# Patient Record
Sex: MALE | Race: WHITE | Employment: FULL TIME | URBAN - NONMETROPOLITAN AREA
[De-identification: names, ages, dates, MRNs, and addresses within clinical notes are randomized per-mention and may not be internally consistent; named-entity substitution may affect disease eponyms.]

---

## 2017-03-07 ENCOUNTER — OFFICE VISIT (OUTPATIENT)
Dept: CARDIOLOGY | Age: 47
End: 2017-03-07

## 2017-03-07 VITALS
HEIGHT: 69 IN | WEIGHT: 183.5 LBS | DIASTOLIC BLOOD PRESSURE: 70 MMHG | SYSTOLIC BLOOD PRESSURE: 110 MMHG | BODY MASS INDEX: 27.18 KG/M2 | HEART RATE: 72 BPM

## 2017-03-07 DIAGNOSIS — I49.3 PVC (PREMATURE VENTRICULAR CONTRACTION): ICD-10-CM

## 2017-03-07 DIAGNOSIS — Z00.00 ANNUAL PHYSICAL EXAM: Primary | ICD-10-CM

## 2017-03-07 PROCEDURE — G8419 CALC BMI OUT NRM PARAM NOF/U: HCPCS | Performed by: INTERNAL MEDICINE

## 2017-03-07 PROCEDURE — 93000 ELECTROCARDIOGRAM COMPLETE: CPT | Performed by: INTERNAL MEDICINE

## 2017-03-07 PROCEDURE — G8427 DOCREV CUR MEDS BY ELIG CLIN: HCPCS | Performed by: INTERNAL MEDICINE

## 2017-03-07 PROCEDURE — 1036F TOBACCO NON-USER: CPT | Performed by: INTERNAL MEDICINE

## 2017-03-07 PROCEDURE — 99213 OFFICE O/P EST LOW 20 MIN: CPT | Performed by: INTERNAL MEDICINE

## 2017-03-07 PROCEDURE — G8484 FLU IMMUNIZE NO ADMIN: HCPCS | Performed by: INTERNAL MEDICINE

## 2017-03-07 RX ORDER — MELOXICAM 7.5 MG/1
7.5 TABLET ORAL DAILY
COMMUNITY
End: 2018-08-26

## 2017-09-05 ENCOUNTER — HOSPITAL ENCOUNTER (OUTPATIENT)
Dept: PHYSICAL THERAPY | Age: 47
Setting detail: THERAPIES SERIES
Discharge: HOME | End: 2017-09-05
Payer: COMMERCIAL

## 2017-09-11 ENCOUNTER — HOSPITAL ENCOUNTER (OUTPATIENT)
Dept: PHYSICAL THERAPY | Age: 47
Setting detail: THERAPIES SERIES
Discharge: HOME | End: 2017-09-11
Payer: COMMERCIAL

## 2017-09-11 PROCEDURE — 97750 PHYSICAL PERFORMANCE TEST: CPT

## 2017-09-26 ENCOUNTER — HOSPITAL ENCOUNTER (OUTPATIENT)
Dept: PHYSICAL THERAPY | Age: 47
Setting detail: THERAPIES SERIES
Discharge: HOME | End: 2017-09-26
Payer: COMMERCIAL

## 2017-09-26 PROCEDURE — 97537 COMMUNITY/WORK REINTEGRATION: CPT

## 2017-09-27 ENCOUNTER — HOSPITAL ENCOUNTER (OUTPATIENT)
Dept: PHYSICAL THERAPY | Age: 47
Setting detail: THERAPIES SERIES
Discharge: HOME | End: 2017-09-27
Payer: COMMERCIAL

## 2017-09-27 PROCEDURE — 97537 COMMUNITY/WORK REINTEGRATION: CPT

## 2017-09-29 ENCOUNTER — HOSPITAL ENCOUNTER (OUTPATIENT)
Dept: PHYSICAL THERAPY | Age: 47
Setting detail: THERAPIES SERIES
End: 2017-09-29
Payer: COMMERCIAL

## 2017-09-29 ENCOUNTER — APPOINTMENT (OUTPATIENT)
Dept: PHYSICAL THERAPY | Age: 47
End: 2017-09-29
Payer: COMMERCIAL

## 2017-10-02 ENCOUNTER — HOSPITAL ENCOUNTER (OUTPATIENT)
Dept: PHYSICAL THERAPY | Age: 47
Setting detail: THERAPIES SERIES
Discharge: HOME | End: 2017-10-02
Payer: COMMERCIAL

## 2017-10-02 PROCEDURE — 97537 COMMUNITY/WORK REINTEGRATION: CPT

## 2017-10-03 ENCOUNTER — HOSPITAL ENCOUNTER (OUTPATIENT)
Dept: PHYSICAL THERAPY | Age: 47
Setting detail: THERAPIES SERIES
End: 2017-10-03
Payer: COMMERCIAL

## 2017-10-04 ENCOUNTER — HOSPITAL ENCOUNTER (OUTPATIENT)
Dept: PHYSICAL THERAPY | Age: 47
Setting detail: THERAPIES SERIES
Discharge: HOME | End: 2017-10-04
Payer: COMMERCIAL

## 2017-10-05 ENCOUNTER — HOSPITAL ENCOUNTER (OUTPATIENT)
Dept: PHYSICAL THERAPY | Age: 47
Setting detail: THERAPIES SERIES
Discharge: HOME | End: 2017-10-05
Payer: COMMERCIAL

## 2017-10-05 PROCEDURE — 97537 COMMUNITY/WORK REINTEGRATION: CPT

## 2017-10-05 NOTE — PROGRESS NOTES
[address] [address]   OUTPATIENT PHYSICAL THERAPY  [facility]ATION  DAILY NOTE    YOB: 1970  REFERRING PHYSICIAN:  Dr. Renetta Andrea  DIAGNOSIS:  C6-7 vertebral fracture, right shoulder sprain  CLAIM #:  [id number]   DATE OF ONSET:  2016    Visit 5     REVIEWED:    []  Program guidelines. []  Attendance agreement.    [x]  Perceived Intensity of Pain - 10 point scale       STRETCHES:     [x] CERVICAL: 5 repetitions x 1 set for 10 seconds     [x] RIGHT SHOULDER:  5 repetitions x 1 set for 10 seconds      [x]LEFT SHOULDER: 5 repetitions x 1 set for 10 seconds     [x] LOW BACK: 5 repetitions x 1 set for 10 seconds     [x] RIGHT HAMSTRIN repetitions x 1 set for 10 seconds     [x] LEFT HAMSTRIN repetitions x 1 set for 10 seconds     [x] RIGHT WRIST: 5 repetitions x 1 set for 10 seconds     [x] LEFT WRIST: 5 repetitions x 1 set for 10 seconds     [x]LEFT QUAD: 5 repetitions x 1 set for 10 seconds     [x]RIGHT QUAD: 5 repetitions x 1 set for 10 seconds     []LEFT CALF: 5 repetitions x 1 set for 10 seconds     []RIGHT CALF: 5 repetitions x 1 set for 10 seconds         GYM TASKS:     [x] BICEPS: 15 pounds x 10 repetitions x 1 sets     [x] TRICEPS: 30 pounds x 10 repetitions x 1 sets     [x] LOW ROWS: 40 pounds x 10 repetitions x 2 sets     [x] LATERAL PULL DOWNS: 40 pounds x 10 repetitions x 2 sets     [x] CHEST PRESS: 40 pounds x 10 repetitions x 2 sets     [x] LEG EXTENSION: 25 pounds x 10 repetitions x 2 sets     [x] LEG CURL: 20 pounds x 10 repetitions x 2 sets     [] LEG PRESS:  pounds x  repetitions x  sets     [] SCAPULAR EXERCISE:  pounds x  repetitions x  sets     [x] BACK EXTENSION: 40 pounds x 10 repetitions x 2 sets     [x] ROTARY TORSO: 25 pounds x 10 repetitions x 1 sets (each way)    [] ABDOMINAL CURLS:  pounds x  repetitions x  sets     [] CALF RAISES:  pounds x  repetitions x  sets     [] OVERHEAD PRESS:  pounds x  repetitions x  sets     [x] PECTORAL DECK:

## 2017-10-06 ENCOUNTER — HOSPITAL ENCOUNTER (OUTPATIENT)
Dept: PHYSICAL THERAPY | Age: 47
Setting detail: THERAPIES SERIES
Discharge: HOME | End: 2017-10-06
Payer: COMMERCIAL

## 2017-10-06 PROCEDURE — 97537 COMMUNITY/WORK REINTEGRATION: CPT

## 2017-10-09 ENCOUNTER — APPOINTMENT (OUTPATIENT)
Dept: PHYSICAL THERAPY | Age: 47
End: 2017-10-09
Payer: COMMERCIAL

## 2017-10-10 ENCOUNTER — APPOINTMENT (OUTPATIENT)
Dept: PHYSICAL THERAPY | Age: 47
End: 2017-10-10
Payer: COMMERCIAL

## 2017-10-11 ENCOUNTER — HOSPITAL ENCOUNTER (OUTPATIENT)
Dept: PHYSICAL THERAPY | Age: 47
Setting detail: THERAPIES SERIES
Discharge: HOME | End: 2017-10-11
Payer: COMMERCIAL

## 2017-10-11 NOTE — PROGRESS NOTES
[address]  PHYSICAL THERAPY MISSED TREATMENT NOTE  OUTPATIENT REHABILITATION    Date: 10/11/2017  Patient Name: iMsha Cardenas        MRN: [id number]   : 1970  (56 y.o.)  Gender: male     REASON FOR MISSED TREATMENT:  Patient did not show for treatment today.    [facility] VS#[id number] 10/11/2017 1:20 PM

## 2017-10-12 ENCOUNTER — HOSPITAL ENCOUNTER (OUTPATIENT)
Dept: PHYSICAL THERAPY | Age: 47
Setting detail: THERAPIES SERIES
Discharge: HOME | End: 2017-10-12
Payer: COMMERCIAL

## 2017-10-12 NOTE — PROGRESS NOTES
[address] [address]  [facility]  PROGRESS NOTE  PHYSICAL THERAPY      YOB: 1970  REFERRING PHYSICIAN:  Dr. Martinez Necessary  DIAGNOSIS:  C6-7 vertebral fracture, right shoulder sprain  CLAIM #:  [id number]   DATE OF ONSET:  2016  MEDICATIONS: The following medication changes have been reported:  None per patient  ALLERGIES:  Patient reports the following changes in allergies since last note:  none  WEEK: Client has been seen for 6 visits of 26 approved. Client in week 3  ATTENDANCE:  Client has been present for the following sessions since evaluation:     Client has had 2 absences. Client has demonstrated [x] good [] poor  timeliness. PAIN:      LOCATION:  Right shoulder/cervical region    PAIN AT REST:   2-3/10. PAIN WITH ACTIVITY:   3-4/10. HIGHEST PAIN RATIN /10. PAIN AGGRAVATION:    []Bending. [x]Lifting. []Twisting. []Sitting. []Lying down. []Standing. []Walking. [] Other:     PAIN BEHAVIORS OBSERVED:   [] None  [x] Minimal  [] Moderate  [] Maximal and include:   [] Tearful.   [] Verbal reports. [] Antalgic gait. [] Grimacing.   [] Guarding. [] Holding. [] Shaking. [] Other:         SUBJECTIVE:    [] Patient reports increased pain with program  [x] Patient reports tolerating program well  [] Other:    OBJECTIVE:     FLOOR TO WAIST: Evaluation = 10 lbs. Current= 15 lbs. Workload Level:    [] Below Sedentary  [] Sedentary   [x]Sedentary Light   [] Light  [] Light Medium  [] Medium  [] Medium Heavy  [] Heavy  [] Very Heavy    Frequency:   [] Low Occasional  [x] Occasional   [] High Occasional  [] Low Frequent  [] Frequent  [] High Frequent  [] Low Constant  [] Constant     WAIST TO WAIST: Evaluation = 10 lbs. Current=20 lbs.           Workload Level:    [] Below Sedentary  [] Sedentary   []Sedentary Light   [x] Light  [] Light Medium  [] Medium  [] Medium Heavy  [] Heavy  [] Very Heavy      Frequency: [] Low Occasional  [x] Occasional   [] High Occasional  [] Low Frequent  [] Frequent  [] High Frequent  [] Low Constant  [] Constant     WAIST TO SHOULDER: Evaluation = 5 lbs. Current = 10 lbs. Workload Level:    [] Below Sedentary  [x] Sedentary   []Sedentary Light   [] Light  [] Light Medium  [] Medium  [] Medium Heavy  [] Heavy  [] Very Heavy      Frequency:   [x] Low Occasional  [] Occasional   [] High Occasional  [] Low Frequent  [] Frequent  [] High Frequent  [] Low Constant  [] Constant             WORK ACTIVITIES/POSTURES:       SQUAT:  []  None  [x]  Occasional [] Frequent [] Constant     REACH OUT (Shoulder level):    []  None  [x]  Occasional [] Frequent [] Constant       REACH UP (Overhead):  [x]  Rare  []  Occasional [] Frequent [] Constant     BEND:    []  None  [x]  Occasional [] Frequent [] Constant     KNEEL:    [x]  Rare  []  Occasional [] Frequent [] Constant     SIT:   []  None  [x]  Occasional [] Frequent [] Constant     STAND:   []  None  [x]  Occasional [] Frequent [] Constant     WALK:   []  None  [x]  Occasional [] Frequent [] Constant     CRAWL:   [x]  Rare  []  Occasional [] Frequent [] Constant     STAIR CLIMBING:   [x]  Rare  []  Occasional [] Frequent [] Constant     LADDER CLIMBING:   [x]  None  []  Occasional [] Frequent [] Constant      GOALS:  SHORT-TERM GOALS:   1. Client will tolerate program 3-5X/week x 2-4 hrs/session with no pain increase > 2 points to demonstrate work tolerance for return to work. NOT MET     2. Client will perform home exercise program and use proper lifting techniques/body mechanics with moderate verbal cues to perform job tasks. MET     3. Client will demonstrate lifting floor to waist 15 pounds occasionally to tolerate job lifting tasks. MET     4. Client will demonstrate lifting waist to overhead 10 pounds occasionally to tolerate job lifting tasks.  NOT MET     5.  Client will tolerate standing, walking, reaching out and reaching up occasionally to tolerate job tasks. MET        LONG-TERM GOALS:     1. Client will tolerate program 4-5 days/week at 4-6 hrs/session with no pain increase to demonstrate work tolerance for return to work recommendations. NOT MET     2. Client will perform home exercise program and use proper lifting techniques/body mechanics independently to perform job tasks. NOT MET     3. Client will demonstrate lifting floor to waist 60 pounds occasionally to tolerate job lifting tasks. NOT MET     4. Client will tolerate lifting waist to overhead 25 pounds occasionally to tolerate job lifting tasks. NOT MET     5. Client will tolerate standing, reaching out and reaching up frequently to tolerate work tasks. NOT MET         TIME FRAME FOR Long Term GOAL ACHIEVEMENT:      ASSESSMENT:      Client has met  3 out of 5 short term goals. Client has not met 5 out of 5 long term goals. Client's overall strength for tasks has [x] improved [] not changed  [] regressed    Client's tolerance/endurance for tasks has [x] improved [] not changed [] regressed    RECOMMENDED TREATMENT:   [x] Continue  [] Discontinue  [] Other:     PLAN/RECOMMENDATIONS:    [x] Continue with present plan of care. [] Continue treatment with revised plan of care as indicated on next daily note. [x] Continue with Home Exercise Program, symptom management and stretches. [] Discharge from program.   [] Recommend community counseling. [] Recommend exercise in the community. [x] Other:   Client has missed a couple of sessions due to illness and car not starting. Client has made slow but steady progress over the first 2 weeks but has recently had some missed appointments. Plan to progress patient and to have patient's attendance improve.    Crissy CHRISTINA#[id number] 10/12/2017 8:11 AM

## 2017-10-13 ENCOUNTER — APPOINTMENT (OUTPATIENT)
Dept: PHYSICAL THERAPY | Age: 47
End: 2017-10-13
Payer: COMMERCIAL

## 2017-10-16 ENCOUNTER — HOSPITAL ENCOUNTER (OUTPATIENT)
Dept: PHYSICAL THERAPY | Age: 47
Setting detail: THERAPIES SERIES
Discharge: HOME | End: 2017-10-16
Payer: COMMERCIAL

## 2017-10-16 PROCEDURE — 97537 COMMUNITY/WORK REINTEGRATION: CPT

## 2017-10-16 NOTE — PROGRESS NOTES
beginning of session    [x] TREADMILL:  15  minutes at end of session    [] UPPER BODY EXERCISER: minutes     [] ELLIPTICAL: minutes     [] NU-STEP  minutes     [x] Other: Lateral raise 30# 10 reps x 2 set  Abdominal crunches: 20 reps x 2 set      EDUCATION/COPING GROUP:     []  Corrected posture on exercise. [x]   Instructed in lifting techniques. [x]   Instructed in pacing skills. []   Instructed on pain management techniques. [x]   Progress/Educated patient in strength exercises. Client instructed to pace by resting a couple of minutes in between exercises. PAIN BEHAVIORS:   []  Tearful. [x]  Verbal reports. []  Antalgic gait. []  Grimacing. []  Holding.   []  Guarding. []  Jerky movements. []  Shaking. []  Limping. []  Breath holding. []  No Pain Behaviors Observed. JOB SIMULATION:     [x]   Progressed/Educated patient in job simulation tasks. Initiated 10 reps box lifting floor to waist 10 pounds. Performing 1 set of 10 in between strength exercises   [x]   Discussed job simulation goals. Client reports working 4 - 5 hours at a lighter duty ([employer]) work. Reports he still would like to work towards returning to his same position. CASE MANAGEMENT:   [x]  No changes with Plan of Care.  []   Authorization details. []   in clinic to meet with client. []   called clinic to talk with client. []  Script. PAIN BEFOR SESSION: 2/10    PAIN AFTER SESSION: 3/10    BLOOD PRESSURE BEFORE SESSION: 121/74  BLOOD PRESSURE AFTER SESSION: Not recorded  DAILY HOURS: 1.5  ADDITIONAL COMMENTS:      [x]  Tolerated program well. []   Modified program to improve tolerance of program.     [x]  Continue with established plan of care working toward long-term goals. Client missed last week. Most of program levels kept the same due to missing a week.      SESSION START TIME:  0730    SESSION STOP TIME:   0900    TIMED CODE TREATMENT MINUTES:  90    UNTIMED CODE TREATMENT MINUTES:  0    TOTAL TREATMENT TIME:  80  Crissy VIDALES#[id number] 9:09 AM 10/16/2017

## 2017-10-17 ENCOUNTER — APPOINTMENT (OUTPATIENT)
Dept: PHYSICAL THERAPY | Age: 47
End: 2017-10-17
Payer: COMMERCIAL

## 2017-10-19 ENCOUNTER — APPOINTMENT (OUTPATIENT)
Dept: PHYSICAL THERAPY | Age: 47
End: 2017-10-19
Payer: COMMERCIAL

## 2017-10-23 ENCOUNTER — HOSPITAL ENCOUNTER (OUTPATIENT)
Dept: PHYSICAL THERAPY | Age: 47
Setting detail: THERAPIES SERIES
Discharge: HOME | End: 2017-10-23
Payer: COMMERCIAL

## 2017-10-23 PROCEDURE — 97537 COMMUNITY/WORK REINTEGRATION: CPT

## 2017-10-24 ENCOUNTER — HOSPITAL ENCOUNTER (OUTPATIENT)
Dept: PHYSICAL THERAPY | Age: 47
Setting detail: THERAPIES SERIES
End: 2017-10-24
Payer: COMMERCIAL

## 2017-10-25 ENCOUNTER — HOSPITAL ENCOUNTER (OUTPATIENT)
Dept: PHYSICAL THERAPY | Age: 47
Setting detail: THERAPIES SERIES
End: 2017-10-25
Payer: COMMERCIAL

## 2017-10-26 ENCOUNTER — APPOINTMENT (OUTPATIENT)
Dept: PHYSICAL THERAPY | Age: 47
End: 2017-10-26
Payer: COMMERCIAL

## 2017-10-27 ENCOUNTER — HOSPITAL ENCOUNTER (OUTPATIENT)
Dept: PHYSICAL THERAPY | Age: 47
Setting detail: THERAPIES SERIES
Discharge: HOME | End: 2017-10-27
Payer: COMMERCIAL

## 2017-10-27 PROCEDURE — 97537 COMMUNITY/WORK REINTEGRATION: CPT

## 2017-10-27 NOTE — PROGRESS NOTES
[facility]  WORK ORIENTED [facility]  PROGRESS NOTE  PHYSICAL THERAPY      YOB: 1970  REFERRING PHYSICIAN:  Dr. Leno De Santiago  DIAGNOSIS:  C6-7 vertebral fracture, right shoulder sprain  CLAIM #:  [id number]   DATE OF ONSET:  2016  MEDICATIONS: The following medication changes have been reported:  None per patient  ALLERGIES:  Patient reports the following changes in allergies since last note:  none  WEEK: Client has been seen for 10 visits of 26 approved. Client in week 5  ATTENDANCE:  Client has been present for the following sessions since evaluation: ; , 10/2, , , , , , ,     Client has had 5 absences. He was in 2 therapy sessions this week (scheduled for 3). Client has demonstrated [x] good [] poor  timeliness. Timeliness is good; attendance not good. PAIN:      LOCATION:  Right shoulder/cervical region    PAIN AT REST:   2-3/10. PAIN WITH ACTIVITY:   3-4/10. HIGHEST PAIN RATIN /10. PAIN AGGRAVATION:    []Bending. [x]Lifting. []Twisting. []Sitting. []Lying down. []Standing. []Walking. [] Other:     PAIN BEHAVIORS OBSERVED:   [] None  [] Minimal  [x] Moderate  [] Maximal and include:   [] Tearful.   [] Verbal reports. [] Antalgic gait. [] Grimacing.   [] Guarding. [] Holding. [] Shaking. [] Other: Throughout the program pain behaviors have been minimal until this morning (10/27) when patient reports 6/10 and significant increase in pain from increase in work hours (now at 6 - 7). Client reports he was tolerating the work hours up to 5 pretty well. SUBJECTIVE:    [] Patient reports increased pain with program  [] Patient reports tolerating program well  [x] Other:     OBJECTIVE:     FLOOR TO WAIST: Evaluation = 10 lbs. Current= 15 lbs.         Workload Level:    [] Below Sedentary  [] Sedentary   [x]Sedentary Light   [] Light  [] Light Medium  [] Medium  [] Medium Heavy  [] proper lifting techniques/body mechanics with moderate verbal cues to perform job tasks. MET     3. Client will demonstrate lifting floor to waist 15 pounds occasionally to tolerate job lifting tasks. MET     4. Client will demonstrate lifting waist to overhead 10 pounds occasionally to tolerate job lifting tasks. NOT MET     5.  Client will tolerate standing, walking, reaching out and reaching up occasionally to tolerate job tasks. MET        LONG-TERM GOALS:     1. Client will tolerate program 4-5 days/week at 4-6 hrs/session with no pain increase to demonstrate work tolerance for return to work recommendations. NOT MET     2. Client will perform home exercise program and use proper lifting techniques/body mechanics independently to perform job tasks. NOT MET     3. Client will demonstrate lifting floor to waist 60 pounds occasionally to tolerate job lifting tasks. NOT MET     4. Client will tolerate lifting waist to overhead 25 pounds occasionally to tolerate job lifting tasks. NOT MET     5. Client will tolerate standing, reaching out and reaching up frequently to tolerate work tasks. NOT MET         TIME FRAME FOR Long Term GOAL ACHIEVEMENT:      ASSESSMENT:      Client has met  3 out of 5 short term goals. Client has not met 5 out of 5 long term goals. Client's overall strength for tasks has [x] improved [] not changed  [] regressed    Client's tolerance/endurance for tasks has [x] improved [] not changed [] regressed    RECOMMENDED TREATMENT:   [x] Continue  [] Discontinue  [] Other:     PLAN/RECOMMENDATIONS:    [x] Continue with present plan of care. [] Continue treatment with revised plan of care as indicated on next daily note. [x] Continue with Home Exercise Program, symptom management and stretches. [] Discharge from program.   [] Recommend community counseling. [] Recommend exercise in the community.    [x] Other:   Client report increased pain on this date for the first time in the program. Plan will need modified preferably decrease number of work hours to allow patient to tolerate increases in strengthening and conditioning. If employer is open, it may be beneficial to have patient participate in work conditioning program only and progress him from 3 days per week to 5 days per week and from 1 - 2 hours per day to 4 - 6 hours per day to better transition from his current workload levels to full return to work task tolerance.      Yuniel Brumfield AO#[id number] 10/27/2017 10:27 AM

## 2017-10-27 NOTE — PROGRESS NOTES
his pain has increased significantly to 6/10. Will discuss with  options in modifying plan of care at this time. Possibly extend work conditioning program and back down on hours at work.      SESSION START TIME:  0800    SESSION STOP TIME:   0900    TIMED CODE TREATMENT MINUTES:  60    UNTIMED CODE TREATMENT MINUTES:  0    TOTAL TREATMENT TIME:  61  Apple Mins AE#[id number] 10:18 AM  10/27/2017

## 2017-12-06 NOTE — PROGRESS NOTES
[facility]  DISCHARGE NOTE  PHYSICAL THERAPY      DATE OF BIRTH:  1970  REFERRING PHYSICIAN:  Dr. Karina Fernandez  DIAGNOSIS:  C6-7 vertebral fracture, right shoulder sprain  CLAIM #:  [id number]   DATE OF ONSET:  9/22/2016    Client is discharged from program. Please refer to previous progress note for details. Thank you for allowing me to assist in the care of this patient.      Elsi Booth #[id number] 12/6/2017 10:43 AM

## 2018-04-09 ENCOUNTER — HOSPITAL ENCOUNTER (OUTPATIENT)
Dept: GENERAL RADIOLOGY | Age: 48
Discharge: HOME | End: 2018-04-09
Payer: MEDICARE

## 2018-04-09 ENCOUNTER — HOSPITAL ENCOUNTER (OUTPATIENT)
Age: 48
Discharge: HOME | End: 2018-04-09
Payer: MEDICARE

## 2018-04-09 DIAGNOSIS — Z02.1 PRE-EMPLOYMENT HEALTH SCREENING EXAMINATION: ICD-10-CM

## 2018-08-26 ENCOUNTER — APPOINTMENT (OUTPATIENT)
Dept: GENERAL RADIOLOGY | Age: 48
End: 2018-08-26
Payer: COMMERCIAL

## 2018-08-26 ENCOUNTER — HOSPITAL ENCOUNTER (EMERGENCY)
Age: 48
Discharge: HOME | End: 2018-08-26
Payer: COMMERCIAL

## 2018-08-26 VITALS
BODY MASS INDEX: 25.77 KG/M2 | HEART RATE: 79 BPM | HEIGHT: 69 IN | SYSTOLIC BLOOD PRESSURE: 115 MMHG | RESPIRATION RATE: 16 BRPM | TEMPERATURE: 98.4 F | WEIGHT: 174 LBS | OXYGEN SATURATION: 97 % | DIASTOLIC BLOOD PRESSURE: 69 MMHG

## 2018-08-26 DIAGNOSIS — M25.522 ELBOW PAIN, LEFT: Primary | ICD-10-CM

## 2018-08-26 PROCEDURE — 73080 X-RAY EXAM OF ELBOW: CPT

## 2018-08-26 PROCEDURE — 99283 EMERGENCY DEPT VISIT LOW MDM: CPT

## 2018-08-26 RX ORDER — IBUPROFEN 600 MG/1
600 TABLET ORAL EVERY 6 HOURS PRN
Qty: 60 TABLET | Refills: 0 | Status: SHIPPED | OUTPATIENT
Start: 2018-08-26

## 2018-08-26 ASSESSMENT — ENCOUNTER SYMPTOMS
EYE REDNESS: 0
EYE DISCHARGE: 0
COUGH: 0
VOMITING: 0
SORE THROAT: 0
SHORTNESS OF BREATH: 0
ABDOMINAL PAIN: 0
DIARRHEA: 0
BACK PAIN: 0
WHEEZING: 0
RHINORRHEA: 0
NAUSEA: 0

## 2018-08-26 ASSESSMENT — PAIN DESCRIPTION - PAIN TYPE: TYPE: ACUTE PAIN

## 2018-08-26 ASSESSMENT — PAIN DESCRIPTION - ORIENTATION: ORIENTATION: LEFT

## 2018-08-26 ASSESSMENT — PAIN DESCRIPTION - DESCRIPTORS: DESCRIPTORS: THROBBING

## 2018-08-26 ASSESSMENT — PAIN DESCRIPTION - LOCATION: LOCATION: ELBOW

## 2018-08-26 ASSESSMENT — PAIN SCALES - GENERAL: PAINLEVEL_OUTOF10: 4

## 2018-08-26 NOTE — ED TRIAGE NOTES
Patient presents to ED with c/o L elbow pain. Denies any known injury but states that it hurts when he moves it.

## 2018-08-26 NOTE — LETTER
[facility] EMERGENCY DEPT  [address] [id number]  Phone: [phone]               August 26, 2018    Patient: Robb Castaneda   YOB: 1970   Date of Visit: 8/26/2018       To Whom It May Concern:    Yanni Arana was seen and treated in our emergency department on 8/26/2018. He may return to work on 08/27/2018.       Sincerely,       Geo Umanzor RN         Signature:__________________________________

## 2018-08-26 NOTE — ED PROVIDER NOTES
normal heart sounds, intact distal pulses and normal pulses. Exam reveals no gallop and no friction rub. No murmur heard. Pulmonary/Chest: Effort normal and breath sounds normal. No respiratory distress. He has no decreased breath sounds. He has no wheezes. He has no rhonchi. He has no rales. Abdominal: Soft. Bowel sounds are normal. He exhibits no distension. There is no tenderness. There is no rebound, no guarding and no CVA tenderness. Musculoskeletal: Normal range of motion. He exhibits no edema. Noted bicep tendon tenderness. Neurological: He is alert and oriented to person, place, and time. He exhibits normal muscle tone. Coordination normal.   Skin: Skin is warm and dry. No rash noted. He is not diaphoretic. Nursing note and vitals reviewed. DIFFERENTIAL DIAGNOSIS:   Bicep tendon strain, elbow strain, forearm strain, ulnar nerve entrapment    DIAGNOSTIC RESULTS     EKG: All EKG's are interpreted by the Emergency Department Physician who either signs or Co-signs this chart in the absence of a cardiologist.    None    RADIOLOGY: non-plain film images(s) such as CT, Ultrasound and MRI are read by the radiologist.    XR ELBOW LEFT (MIN 3 VIEWS)   Final Result   Negative left elbow            **This report has been created using voice recognition software. It may contain minor errors which are inherent in voice recognition technology. **      Final report electronically signed by Dr. Phoenix Garcia on 8/26/2018 4:31 PM          LABS:     Labs Reviewed - No data to display    EMERGENCY DEPARTMENT COURSE:   Vitals:    Vitals:    08/26/18 1607   BP: 115/69   Pulse: 79   Resp: 16   Temp: 98.4 °F (36.9 °C)   TempSrc: Oral   SpO2: 97%   Weight: 174 lb (78.9 kg)   Height: 5' 9\" (1.753 m)       4:39 PM: The patient was seen and evaluated. MDM:  Patient was seen and evaluated by me at bedside. Patient did not appear in any distress. History and physical exam were obtained.  Appropriate imaging studies

## 2018-08-29 ENCOUNTER — HOSPITAL ENCOUNTER (EMERGENCY)
Age: 48
Discharge: HOME | End: 2018-08-29
Payer: MEDICARE

## 2018-08-29 VITALS
DIASTOLIC BLOOD PRESSURE: 87 MMHG | SYSTOLIC BLOOD PRESSURE: 129 MMHG | OXYGEN SATURATION: 97 % | TEMPERATURE: 97.6 F | BODY MASS INDEX: 26.07 KG/M2 | WEIGHT: 176 LBS | HEIGHT: 69 IN | HEART RATE: 61 BPM | RESPIRATION RATE: 19 BRPM

## 2018-08-29 DIAGNOSIS — M25.522 LEFT ELBOW PAIN: Primary | ICD-10-CM

## 2018-08-29 PROCEDURE — 99283 EMERGENCY DEPT VISIT LOW MDM: CPT

## 2018-08-29 ASSESSMENT — PAIN DESCRIPTION - LOCATION: LOCATION: ARM

## 2018-08-29 ASSESSMENT — ENCOUNTER SYMPTOMS
EYE REDNESS: 0
SORE THROAT: 0
CONSTIPATION: 0
COUGH: 0
RHINORRHEA: 0
VOMITING: 0
BACK PAIN: 0
ABDOMINAL PAIN: 0
NAUSEA: 0
DIARRHEA: 0
EYE DISCHARGE: 0
WHEEZING: 0
COLOR CHANGE: 0
SHORTNESS OF BREATH: 0

## 2018-08-29 ASSESSMENT — PAIN DESCRIPTION - PAIN TYPE: TYPE: ACUTE PAIN

## 2018-08-29 ASSESSMENT — PAIN SCALES - GENERAL: PAINLEVEL_OUTOF10: 6

## 2018-08-29 ASSESSMENT — PAIN DESCRIPTION - DESCRIPTORS: DESCRIPTORS: ACHING

## 2018-08-29 ASSESSMENT — PAIN DESCRIPTION - FREQUENCY: FREQUENCY: CONTINUOUS

## 2018-08-29 ASSESSMENT — PAIN DESCRIPTION - ORIENTATION: ORIENTATION: LEFT

## 2018-08-29 NOTE — ED NOTES
Patient presents to the ED with complaint of L AC/elbow pain. Patient reports he was at work 1 1/2 weeks ago when he injury that area due to pushing and pulling objects. Patient reports he thought the pain would go away, although it hasn't. Pulses palpable with adequate color to site.       Vanessa Jha RN  08/29/18 [id number]

## 2018-08-29 NOTE — ED PROVIDER NOTES
[facility] EMERGENCY DEPT      CHIEF COMPLAINT       Chief Complaint   Patient presents with    Arm Pain     left       Nurses Notes reviewed and I agree except as noted in the HPI. HISTORY OF PRESENT ILLNESS    Morales Pisano is a 53 y.o. male who presents to the emergency department for evaluation of left elbow pain. The patient states that his symptoms began approximately a week and a half ago without known injury. However, the patient states that he thinks his left elbow pain is due to heavy lifting, pushing, and pulling at work. The patient was seen for this pain 3 days ago in this department and had negative x-rays performed. The patient states that he is here today because his elbow pain has continued. He currently reports his left elbow pain to be a 6/10 in severity and describes it as a continuous aching. He denies any bruising or swelling of the left elbow. He denies any weakness, or tingling of the left upper extremity but does report numbness in his left little finger. He states that he needs a work note for today. He has no additional complaints at this time. REVIEW OF SYSTEMS     Review of Systems   Constitutional: Negative for chills, fatigue and fever. HENT: Negative for congestion, ear pain, rhinorrhea and sore throat. Eyes: Negative for discharge and redness. Respiratory: Negative for cough, shortness of breath and wheezing. Cardiovascular: Negative for chest pain and palpitations. Gastrointestinal: Negative for abdominal pain, constipation, diarrhea, nausea and vomiting. Genitourinary: Negative for decreased urine volume, difficulty urinating and dysuria. Musculoskeletal: Positive for arthralgias (left elbow). Negative for back pain, joint swelling, myalgias, neck pain and neck stiffness. Skin: Negative for color change, pallor and rash. Neurological: Negative for dizziness, syncope, weakness, light-headedness, numbness and headaches.    Hematological: Negative for adenopathy. Psychiatric/Behavioral: Negative for agitation, confusion, dysphoric mood and suicidal ideas. The patient is not nervous/anxious. PAST MEDICAL HISTORY    has a past medical history of Hypertension. SURGICAL HISTORY      has a past surgical history that includes fracture surgery (09/22/2016); tracheostomy (09/22/2016); and Mandible surgery (9/22/16,1/3/17). CURRENT MEDICATIONS       Previous Medications    ASPIRIN 81 MG TABLET    Take 81 mg by mouth daily    IBUPROFEN (IBU) 600 MG TABLET    Take 1 tablet by mouth every 6 hours as needed for Pain    METOPROLOL TARTRATE (LOPRESSOR) 25 MG TABLET    TAKE ONE-HALF TABLET BY MOUTH TWICE DAILY       ALLERGIES     has No Known Allergies. FAMILY HISTORY     indicated that his mother is alive. He indicated that his father is alive. He indicated that his paternal grandfather is alive. family history includes Cancer (age of onset: 79) in his father; Diabetes in his paternal grandfather; Heart Disease in his mother; Stroke in his mother. SOCIAL HISTORY      reports that he has been smoking Cigarettes. He has a 11.50 pack-year smoking history. He has never used smokeless tobacco. He reports that he drinks alcohol. He reports that he does not use drugs. PHYSICAL EXAM     INITIAL VITALS:  height is 5' 9\" (1.753 m) and weight is 176 lb (79.8 kg). His oral temperature is 97.6 °F (36.4 °C). His blood pressure is 129/87 and his pulse is 61. His respiration is 19 and oxygen saturation is 97%. Physical Exam   Constitutional: He is oriented to person, place, and time. He appears well-developed and well-nourished. No distress. HENT:   Head: Normocephalic and atraumatic. Right Ear: External ear normal.   Left Ear: External ear normal.   Nose: Nose normal.   Mouth/Throat: Oropharynx is clear and moist.   Eyes: Pupils are equal, round, and reactive to light. Conjunctivae and EOM are normal. Right eye exhibits no discharge.  Left eye stable condition, and the patient will return to the ED if the symptoms become more severe in nature or otherwise change. He will otherwise follow up with occupational health as instructed for additional evaluation and management. CRITICAL CARE:   None    CONSULTS:  None    PROCEDURES:  None    FINAL IMPRESSION      1. Left elbow pain          DISPOSITION/PLAN     1. Left elbow pain       I have given the patient strict written and verbal instructions about care at home, follow-up, and signs and symptoms of worsening of condition, and the patient did verbalize understanding of these instructions. Patient was discharged in stable condition. Will return if symptoms change or worsen, or for any sign or symptom deemed emergent by the patient or family members. Follow up as an outpatient or sooner if symptoms warrant.      PATIENT REFERRED TO:  [address] [place], [place] [id number]  [phone]  Call today  As needed, If symptoms worsen, For re-check    [address] [phone]  Call in 3 days  As needed, If symptoms worsen, For re-check      DISCHARGE MEDICATIONS:  New Prescriptions    No medications on file       (Please note that portions of this note were completed with a voice recognition program.  Efforts were made to edit the dictations but occasionally words are mis-transcribed.)    ALFONSO Chan PA-C  08/29/18 [id number]